# Patient Record
Sex: MALE | Race: BLACK OR AFRICAN AMERICAN | Employment: FULL TIME | URBAN - METROPOLITAN AREA
[De-identification: names, ages, dates, MRNs, and addresses within clinical notes are randomized per-mention and may not be internally consistent; named-entity substitution may affect disease eponyms.]

---

## 2019-12-21 ENCOUNTER — HOSPITAL ENCOUNTER (OUTPATIENT)
Age: 69
Setting detail: OBSERVATION
Discharge: HOME | End: 2019-12-23
Attending: EMERGENCY MEDICINE | Admitting: EMERGENCY MEDICINE
Payer: MEDICARE

## 2019-12-21 ENCOUNTER — APPOINTMENT (OUTPATIENT)
Dept: GENERAL RADIOLOGY | Age: 69
End: 2019-12-21
Payer: MEDICARE

## 2019-12-21 PROBLEM — R07.9 CHEST PAIN: Status: ACTIVE | Noted: 2019-12-21

## 2019-12-21 LAB
A/G RATIO: 1.3 (ref 1.1–2.2)
ALBUMIN SERPL-MCNC: 3.9 G/DL (ref 3.4–5)
ALP BLD-CCNC: 82 U/L (ref 40–129)
ALT SERPL-CCNC: 10 U/L (ref 10–40)
ANION GAP SERPL CALCULATED.3IONS-SCNC: 10 MMOL/L (ref 3–16)
AST SERPL-CCNC: 14 U/L (ref 15–37)
BASOPHILS ABSOLUTE: 0.1 K/UL (ref 0–0.2)
BASOPHILS RELATIVE PERCENT: 1.3 %
BILIRUB SERPL-MCNC: 0.4 MG/DL (ref 0–1)
BUN BLDV-MCNC: 12 MG/DL (ref 7–20)
CALCIUM SERPL-MCNC: 9.6 MG/DL (ref 8.3–10.6)
CHLORIDE BLD-SCNC: 103 MMOL/L (ref 99–110)
CO2: 25 MMOL/L (ref 21–32)
CREAT SERPL-MCNC: 0.7 MG/DL (ref 0.8–1.3)
EOSINOPHILS ABSOLUTE: 0.3 K/UL (ref 0–0.6)
EOSINOPHILS RELATIVE PERCENT: 5.6 %
GFR AFRICAN AMERICAN: >60
GFR NON-AFRICAN AMERICAN: >60
GLOBULIN: 3 G/DL
GLUCOSE BLD-MCNC: 101 MG/DL (ref 70–99)
HCT VFR BLD CALC: 38.6 % (ref 40.5–52.5)
HEMOGLOBIN: 12.3 G/DL (ref 13.5–17.5)
LIPASE: 19 U/L (ref 13–60)
LYMPHOCYTES ABSOLUTE: 1.7 K/UL (ref 1–5.1)
LYMPHOCYTES RELATIVE PERCENT: 32.5 %
MAGNESIUM: 2.1 MG/DL (ref 1.8–2.4)
MCH RBC QN AUTO: 25.8 PG (ref 26–34)
MCHC RBC AUTO-ENTMCNC: 32 G/DL (ref 31–36)
MCV RBC AUTO: 80.8 FL (ref 80–100)
MONOCYTES ABSOLUTE: 0.5 K/UL (ref 0–1.3)
MONOCYTES RELATIVE PERCENT: 9.2 %
NEUTROPHILS ABSOLUTE: 2.7 K/UL (ref 1.7–7.7)
NEUTROPHILS RELATIVE PERCENT: 51.4 %
PDW BLD-RTO: 14.5 % (ref 12.4–15.4)
PLATELET # BLD: 209 K/UL (ref 135–450)
PMV BLD AUTO: 8.5 FL (ref 5–10.5)
POTASSIUM SERPL-SCNC: 4.3 MMOL/L (ref 3.5–5.1)
PRO-BNP: 114 PG/ML (ref 0–124)
RBC # BLD: 4.78 M/UL (ref 4.2–5.9)
SODIUM BLD-SCNC: 138 MMOL/L (ref 136–145)
TOTAL PROTEIN: 6.9 G/DL (ref 6.4–8.2)
TROPONIN: <0.01 NG/ML
WBC # BLD: 5.2 K/UL (ref 4–11)

## 2019-12-21 PROCEDURE — G0378 HOSPITAL OBSERVATION PER HR: HCPCS

## 2019-12-21 PROCEDURE — 99285 EMERGENCY DEPT VISIT HI MDM: CPT

## 2019-12-21 PROCEDURE — 83690 ASSAY OF LIPASE: CPT

## 2019-12-21 PROCEDURE — 2580000003 HC RX 258: Performed by: HOSPITALIST

## 2019-12-21 PROCEDURE — 36415 COLL VENOUS BLD VENIPUNCTURE: CPT

## 2019-12-21 PROCEDURE — 96372 THER/PROPH/DIAG INJ SC/IM: CPT

## 2019-12-21 PROCEDURE — 6370000000 HC RX 637 (ALT 250 FOR IP): Performed by: PHYSICIAN ASSISTANT

## 2019-12-21 PROCEDURE — 93005 ELECTROCARDIOGRAM TRACING: CPT | Performed by: PHYSICIAN ASSISTANT

## 2019-12-21 PROCEDURE — 85025 COMPLETE CBC W/AUTO DIFF WBC: CPT

## 2019-12-21 PROCEDURE — 80053 COMPREHEN METABOLIC PANEL: CPT

## 2019-12-21 PROCEDURE — 83735 ASSAY OF MAGNESIUM: CPT

## 2019-12-21 PROCEDURE — 6370000000 HC RX 637 (ALT 250 FOR IP): Performed by: HOSPITALIST

## 2019-12-21 PROCEDURE — 83880 ASSAY OF NATRIURETIC PEPTIDE: CPT

## 2019-12-21 PROCEDURE — 71046 X-RAY EXAM CHEST 2 VIEWS: CPT

## 2019-12-21 PROCEDURE — 6360000002 HC RX W HCPCS: Performed by: HOSPITALIST

## 2019-12-21 PROCEDURE — 84484 ASSAY OF TROPONIN QUANT: CPT

## 2019-12-21 RX ORDER — MORPHINE SULFATE 2 MG/ML
2 INJECTION, SOLUTION INTRAMUSCULAR; INTRAVENOUS EVERY 4 HOURS PRN
Status: DISCONTINUED | OUTPATIENT
Start: 2019-12-21 | End: 2019-12-23 | Stop reason: HOSPADM

## 2019-12-21 RX ORDER — ATORVASTATIN CALCIUM 40 MG/1
40 TABLET, FILM COATED ORAL NIGHTLY
Status: DISCONTINUED | OUTPATIENT
Start: 2019-12-21 | End: 2019-12-23 | Stop reason: HOSPADM

## 2019-12-21 RX ORDER — SODIUM CHLORIDE 0.9 % (FLUSH) 0.9 %
10 SYRINGE (ML) INJECTION PRN
Status: DISCONTINUED | OUTPATIENT
Start: 2019-12-21 | End: 2019-12-23 | Stop reason: HOSPADM

## 2019-12-21 RX ORDER — ONDANSETRON 2 MG/ML
4 INJECTION INTRAMUSCULAR; INTRAVENOUS EVERY 6 HOURS PRN
Status: DISCONTINUED | OUTPATIENT
Start: 2019-12-21 | End: 2019-12-23 | Stop reason: HOSPADM

## 2019-12-21 RX ORDER — SODIUM CHLORIDE 0.9 % (FLUSH) 0.9 %
10 SYRINGE (ML) INJECTION EVERY 12 HOURS SCHEDULED
Status: DISCONTINUED | OUTPATIENT
Start: 2019-12-21 | End: 2019-12-23 | Stop reason: HOSPADM

## 2019-12-21 RX ORDER — NITROGLYCERIN 0.4 MG/1
0.4 TABLET SUBLINGUAL EVERY 5 MIN PRN
Status: DISCONTINUED | OUTPATIENT
Start: 2019-12-21 | End: 2019-12-23 | Stop reason: HOSPADM

## 2019-12-21 RX ORDER — ASPIRIN 81 MG/1
81 TABLET, CHEWABLE ORAL DAILY
Status: DISCONTINUED | OUTPATIENT
Start: 2019-12-22 | End: 2019-12-23 | Stop reason: HOSPADM

## 2019-12-21 RX ADMIN — DESMOPRESSIN ACETATE 40 MG: 0.2 TABLET ORAL at 21:37

## 2019-12-21 RX ADMIN — Medication 10 ML: at 21:38

## 2019-12-21 RX ADMIN — NITROGLYCERIN 1 INCH: 20 OINTMENT TOPICAL at 12:34

## 2019-12-21 RX ADMIN — ENOXAPARIN SODIUM 40 MG: 40 INJECTION SUBCUTANEOUS at 21:38

## 2019-12-21 ASSESSMENT — ENCOUNTER SYMPTOMS
DIARRHEA: 0
CONSTIPATION: 0
WHEEZING: 0
SHORTNESS OF BREATH: 0
BACK PAIN: 0
COLOR CHANGE: 0
COUGH: 0
STRIDOR: 0
NAUSEA: 0
ABDOMINAL DISTENTION: 0
ABDOMINAL PAIN: 0
VOMITING: 0

## 2019-12-21 ASSESSMENT — PAIN SCALES - GENERAL
PAINLEVEL_OUTOF10: 0
PAINLEVEL_OUTOF10: 5
PAINLEVEL_OUTOF10: 0
PAINLEVEL_OUTOF10: 0

## 2019-12-21 NOTE — ED PROVIDER NOTES
stridor. Cardiovascular: Positive for chest pain. Negative for palpitations and leg swelling. Gastrointestinal: Negative for abdominal distention, abdominal pain, constipation, diarrhea, nausea and vomiting. Endocrine: Negative. Genitourinary: Negative. Musculoskeletal: Negative for back pain, neck pain and neck stiffness. Skin: Negative for color change, pallor, rash and wound. Neurological: Negative for dizziness, tremors, seizures, syncope, facial asymmetry, speech difficulty, weakness, light-headedness, numbness and headaches. Psychiatric/Behavioral: Negative for confusion. All other systems reviewed and are negative. Positives and Pertinent negatives as per HPI. Except as noted above in the ROS, all other systems were reviewed and negative. PAST MEDICAL HISTORY     Past Medical History:   Diagnosis Date    Cancer [facility])     prostate         SURGICAL HISTORY   History reviewed. No pertinent surgical history. CURRENTMEDICATIONS       Previous Medications    No medications on file         ALLERGIES     Patient has no known allergies. FAMILYHISTORY     History reviewed. No pertinent family history. SOCIAL HISTORY       Social History     Tobacco Use    Smoking status: Never Smoker    Smokeless tobacco: Never Used   Substance Use Topics    Alcohol use: Not Currently    Drug use: Not Currently       SCREENINGS             PHYSICAL EXAM    (up to 7 for level 4, 8 or more for level 5)     ED Triage Vitals [12/21/19 1150]   BP Temp Temp Source Pulse Resp SpO2 Height Weight   (!) 173/80 97.9 °F (36.6 °C) Infrared 52 15 98 % 6' 3\" (1.905 m) 225 lb (102.1 kg)       Physical Exam  Vitals signs and nursing note reviewed. Constitutional:       Appearance: Normal appearance. He is well-developed. He is not toxic-appearing or diaphoretic. HENT:      Head: Normocephalic and atraumatic.       Right Ear: Tympanic membrane, ear canal and external ear normal.      Left Ear: without acute process. No acute process. PROCEDURES   Unless otherwise noted below, none     Procedures    CRITICAL CARE TIME   N/A    CONSULTS:  Dr. Leslie Cox will admit ([id number] [phone])    EMERGENCY DEPARTMENT COURSE and DIFFERENTIAL DIAGNOSIS/MDM:   Vitals:    Vitals:    12/21/19 1150 12/21/19 1235   BP: (!) 173/80 135/61   Pulse: 52 52   Resp: 15 18   Temp: 97.9 °F (36.6 °C)    TempSrc: Infrared    SpO2: 98% 98%   Weight: 225 lb (102.1 kg)    Height: 6' 3\" (1.905 m)        Patient was given thefollowing medications:  Medications   nitroglycerin (NITRO-BID) 2 % ointment 1 inch (1 inch Topical Given 12/21/19 1234)       This patient presents to the emergency department complaining of left-sided chest pain on and off for 3 days. He does have abnormal EKG changes. No former EKG to compare to. Troponin is negative. Patient and family understand and agree with plan for admission. Vital stable at this time. We have addressed concerns and expectations. My suspicion is low for  PE, myocarditis, pericarditis, endocarditis, acute pulmonary edema, pleural effusion, pericardial effusion, cardiac tamponade, acute CHF exacerbation, thoracic aortic dissection, esophageal rupture, other life-threatening arrhythmia, hypertensive urgency or emergency, hemothorax, pulmonary contusion, subcutaneous emphysema, flail chest, pneumo mediastinum, rib fracture, pneumonia, pneumothorax, or other concerning pathology. FINAL IMPRESSION      1. Acute chest pain    2. Abnormal EKG          DISPOSITION/PLAN   DISPOSITION Decision To Admit 12/21/2019 01:14:37 PM      PATIENT REFERREDTO:  No follow-up provider specified.     DISCHARGE MEDICATIONS:  New Prescriptions    No medications on file       DISCONTINUED MEDICATIONS:  Discontinued Medications    No medications on file              (Please note that portions of this note were completed with a voice recognition program.  Efforts were made to edit the dictations but occasionally words are mis-transcribed.)    Lelo Mauricio PA-C (electronically signed)           Lelo Mauricio PA-C  12/21/19 3478

## 2019-12-21 NOTE — ED PROVIDER NOTES
I personally evaluated and examined the patient in conjunction with the [place] and agree with the assessment, treatment plan and disposition of the patient as recorded by the [place]. I reviewed pertinent nursing notes, triage notes, vital signs, past medical history, family and social history, medications, and allergies. Complete review of systems was conducted by the [place] and/or myself. Review of systems is negative except as documented in the history of present illness. Brief HPI: 28-year-old gentleman presents the emergency department chief complaint of left-sided chest pain, pressure intermittent without radiation. No alleviating or aggravating factors. Reports history of smoking, and hypertension. No family history of ACS. Denies past history of DVT or PE, denies hormone therapy use, denies recent surgery, denies recent travel. No calf pain or lower extremity swelling. Physical Exam: General: Patient is in no acute distress   Head: Normocephalic, atraumatic, pupils are equal and reactive to light. EOMI. Neck: Neck is supple. No JVD noted. Heart: RRR no murmurs, rubs, or gallops   Lungs: CTA BL   Abdomen: soft, non-tender, non-distended   Extremities: no lower extremity edema. Capillary refill is less than 2 seconds   Skin: no cyanosis or pallor; no rashes noted   Neuro: CN's 2-12 are grossly intact. No focal neurologic deficit appreciated. Cardiac work-up initiated. Patient with EKG abnormalities. Heart score is moderate. Recommend admission. EKG: EKG interpretation by ED physician: Normal axis, sinus bradycardia 53 bpm with anterior and lateral T wave inversions. No  Old EKGs to compare this to. FINAL IMPRESSION     1. Acute chest pain    2. Abnormal EKG            Electronically signed by:   Regis Harding DO  12/21/19 2012

## 2019-12-21 NOTE — H&P
[facility]    12/21/2019 1:44 PM    Patient Information:  Ace Mei is a 77 y.o. male [id number]  PCP:  No primary care provider on file. (Tel: None )    Chief complaint:    Chief Complaint   Patient presents with    Chest Pain     left sided chest pain/ pressure began three days ago. History of Present Illness:    Kyle Tsang is a 77 y.o. present with chest pain. Onset about 3 days ago intermittent. Patient said pressure-like pain. Happens when he tries to exert himself. But no significant activity patient has sedentary life. Says normally sits at the desk does not exercise plays golf occasionally has not played golf for past 3 weeks. Patient rated pain 3 out of 10. Was given nitro which helped. No previous history of cardiac disease and stress test years ago with no acute finding. Currently chest pain-free   onset about       History obtained from patient  Old medical records show   REVIEW OF SYSTEMS:   Constitutional: Negative for fever,chills or night sweats  ENT: Negative for rhinorrhea, epistaxis, hoarseness, sore throat. Respiratory: Negative for shortness of breath,wheezing  Cardiovascular:postive  for chest pain, negative for palpitations   Gastrointestinal: Negative for nausea, vomiting, diarrhea  Genitourinary: Negative for polyuria, dysuria   Hematologic/Lymphatic: Negative for bleeding tendency, easy bruising  Musculoskeletal: Negative for myalgias and arthralgias  Neurologic: Negative for confusion,dysarthria. Skin: Negative for itching,rash  Psychiatric: Negative for depression,anxiety, agitation. Endocrine: Negative for polydipsia,polyuria,heat /cold intolerance. Past Medical History:   has a past medical history of Cancer ([facility] [address] Past Surgical History:   has no past surgical history on file.      Medications:  No current facility-administered medications on file prior to encounter. No current outpatient medications on file prior to encounter. Allergies:  No Known Allergies     Social History:   reports that he has never smoked. He has never used smokeless tobacco. He reports previous alcohol use. He reports previous drug use. Family History:  family history is not on file. ,     Physical Exam:  /61   Pulse 52   Temp 97.9 °F (36.6 °C) (Infrared)   Resp 18   Ht 6' 3\" (1.905 m)   Wt 225 lb (102.1 kg)   SpO2 98%   BMI 28.12 kg/m²     General appearance:  Appears comfortable. Well nourished  Eyes: Sclera clear, pupils equal  ENT: Moist mucus membranes, no thrush. Trachea midline. Cardiovascular: Regular rhythm, normal S1, S2. No murmur, gallop, rub. No edema in lower extremities  Respiratory: Clear to auscultation bilaterally, no wheeze, good inspiratory effort  Gastrointestinal: Abdomen soft, non-tender, not distended, normal bowel sounds  Musculoskeletal: No cyanosis in digits, neck supple  Neurology: Cranial nerves grossly intact. Alert and oriented in time, place and person. No speech or motor deficits  Psychiatry: Appropriate affect. Not agitated  Skin: Warm, dry, normal turgor, no rash    Labs:  CBC:   Lab Results   Component Value Date    WBC 5.2 12/21/2019    RBC 4.78 12/21/2019    HGB 12.3 12/21/2019    HCT 38.6 12/21/2019    MCV 80.8 12/21/2019    MCH 25.8 12/21/2019    MCHC 32.0 12/21/2019    RDW 14.5 12/21/2019     12/21/2019    MPV 8.5 12/21/2019     BMP:    Lab Results   Component Value Date     12/21/2019    K 4.3 12/21/2019     12/21/2019    CO2 25 12/21/2019    BUN 12 12/21/2019    CREATININE 0.7 12/21/2019    CALCIUM 9.6 12/21/2019    GFRAA >60 12/21/2019    LABGLOM >60 12/21/2019    GLUCOSE 101 12/21/2019       Chest Xray:   EKG:    I visualized CXR images and EKG strips-         Problem List  Active Problems:    Chest pain  Resolved Problems:    * No resolved hospital problems.  *        Assessment/Plan:

## 2019-12-22 LAB
EKG ATRIAL RATE: 53 BPM
EKG DIAGNOSIS: NORMAL
EKG P AXIS: 59 DEGREES
EKG P-R INTERVAL: 166 MS
EKG Q-T INTERVAL: 402 MS
EKG QRS DURATION: 92 MS
EKG QTC CALCULATION (BAZETT): 377 MS
EKG R AXIS: 43 DEGREES
EKG T AXIS: 102 DEGREES
EKG VENTRICULAR RATE: 53 BPM
HCT VFR BLD CALC: 35.9 % (ref 40.5–52.5)
HEMOGLOBIN: 11.5 G/DL (ref 13.5–17.5)
MCH RBC QN AUTO: 25.9 PG (ref 26–34)
MCHC RBC AUTO-ENTMCNC: 32 G/DL (ref 31–36)
MCV RBC AUTO: 81.1 FL (ref 80–100)
PDW BLD-RTO: 14.5 % (ref 12.4–15.4)
PLATELET # BLD: 204 K/UL (ref 135–450)
PMV BLD AUTO: 8.5 FL (ref 5–10.5)
RBC # BLD: 4.43 M/UL (ref 4.2–5.9)
WBC # BLD: 6.4 K/UL (ref 4–11)

## 2019-12-22 PROCEDURE — 85027 COMPLETE CBC AUTOMATED: CPT

## 2019-12-22 PROCEDURE — 93010 ELECTROCARDIOGRAM REPORT: CPT | Performed by: INTERNAL MEDICINE

## 2019-12-22 PROCEDURE — 36415 COLL VENOUS BLD VENIPUNCTURE: CPT

## 2019-12-22 PROCEDURE — 6360000002 HC RX W HCPCS: Performed by: HOSPITALIST

## 2019-12-22 PROCEDURE — G0378 HOSPITAL OBSERVATION PER HR: HCPCS

## 2019-12-22 PROCEDURE — 6370000000 HC RX 637 (ALT 250 FOR IP): Performed by: PHYSICIAN ASSISTANT

## 2019-12-22 PROCEDURE — 96372 THER/PROPH/DIAG INJ SC/IM: CPT

## 2019-12-22 PROCEDURE — 2580000003 HC RX 258: Performed by: HOSPITALIST

## 2019-12-22 PROCEDURE — 6370000000 HC RX 637 (ALT 250 FOR IP): Performed by: HOSPITALIST

## 2019-12-22 RX ORDER — ACETAMINOPHEN 325 MG/1
650 TABLET ORAL EVERY 4 HOURS PRN
Status: DISCONTINUED | OUTPATIENT
Start: 2019-12-22 | End: 2019-12-23 | Stop reason: HOSPADM

## 2019-12-22 RX ADMIN — ASPIRIN 81 MG 81 MG: 81 TABLET ORAL at 08:22

## 2019-12-22 RX ADMIN — Medication 10 ML: at 22:04

## 2019-12-22 RX ADMIN — Medication 10 ML: at 08:22

## 2019-12-22 RX ADMIN — DESMOPRESSIN ACETATE 40 MG: 0.2 TABLET ORAL at 22:04

## 2019-12-22 RX ADMIN — ACETAMINOPHEN 650 MG: 325 TABLET, FILM COATED ORAL at 04:37

## 2019-12-22 RX ADMIN — ENOXAPARIN SODIUM 40 MG: 40 INJECTION SUBCUTANEOUS at 22:04

## 2019-12-22 ASSESSMENT — PAIN SCALES - GENERAL
PAINLEVEL_OUTOF10: 0
PAINLEVEL_OUTOF10: 7
PAINLEVEL_OUTOF10: 0

## 2019-12-22 ASSESSMENT — PAIN DESCRIPTION - DESCRIPTORS: DESCRIPTORS: HEADACHE

## 2019-12-22 ASSESSMENT — PAIN DESCRIPTION - LOCATION: LOCATION: HEAD

## 2019-12-22 ASSESSMENT — PAIN DESCRIPTION - PAIN TYPE: TYPE: ACUTE PAIN

## 2019-12-22 NOTE — PLAN OF CARE
Problem: Pain:  Goal: Pain level will decrease  Description  Pain level will decrease  12/22/2019 1558 by Eva King RN  Outcome: Ongoing  12/22/2019 0805 by Yadiel Boyd RN  Outcome: Ongoing   Denies pain , since headache was resolved. Problem: Falls - Risk of:  Goal: Will remain free from falls  Description  Will remain free from falls  12/22/2019 1558 by Eva King RN  Outcome: Ongoing  12/22/2019 0805 by Yadiel Boyd RN  Outcome: Ongoing   Up in halls and in room. Waiting for tests tomorrow and hoping for the best outcome or at least the least invasive outcome.

## 2019-12-23 VITALS
OXYGEN SATURATION: 99 % | SYSTOLIC BLOOD PRESSURE: 160 MMHG | TEMPERATURE: 96.8 F | DIASTOLIC BLOOD PRESSURE: 80 MMHG | HEART RATE: 56 BPM | HEIGHT: 75 IN | RESPIRATION RATE: 18 BRPM | WEIGHT: 221.78 LBS | BODY MASS INDEX: 27.58 KG/M2

## 2019-12-23 LAB — POC ACT LR: 169 SEC

## 2019-12-23 PROCEDURE — 6370000000 HC RX 637 (ALT 250 FOR IP): Performed by: HOSPITALIST

## 2019-12-23 PROCEDURE — 6360000004 HC RX CONTRAST MEDICATION: Performed by: INTERNAL MEDICINE

## 2019-12-23 PROCEDURE — 93458 L HRT ARTERY/VENTRICLE ANGIO: CPT | Performed by: INTERNAL MEDICINE

## 2019-12-23 PROCEDURE — G0378 HOSPITAL OBSERVATION PER HR: HCPCS

## 2019-12-23 PROCEDURE — C1894 INTRO/SHEATH, NON-LASER: HCPCS

## 2019-12-23 PROCEDURE — 93571 IV DOP VEL&/PRESS C FLO 1ST: CPT

## 2019-12-23 PROCEDURE — 2720000010 HC SURG SUPPLY STERILE

## 2019-12-23 PROCEDURE — 99153 MOD SED SAME PHYS/QHP EA: CPT

## 2019-12-23 PROCEDURE — 2500000003 HC RX 250 WO HCPCS

## 2019-12-23 PROCEDURE — C1887 CATHETER, GUIDING: HCPCS

## 2019-12-23 PROCEDURE — 85347 COAGULATION TIME ACTIVATED: CPT

## 2019-12-23 PROCEDURE — 6360000002 HC RX W HCPCS

## 2019-12-23 PROCEDURE — 93017 CV STRESS TEST TRACING ONLY: CPT | Performed by: INTERNAL MEDICINE

## 2019-12-23 PROCEDURE — 99152 MOD SED SAME PHYS/QHP 5/>YRS: CPT | Performed by: INTERNAL MEDICINE

## 2019-12-23 PROCEDURE — 6360000002 HC RX W HCPCS: Performed by: INTERNAL MEDICINE

## 2019-12-23 PROCEDURE — 99152 MOD SED SAME PHYS/QHP 5/>YRS: CPT

## 2019-12-23 PROCEDURE — 2580000003 HC RX 258

## 2019-12-23 PROCEDURE — 93458 L HRT ARTERY/VENTRICLE ANGIO: CPT

## 2019-12-23 PROCEDURE — 93571 IV DOP VEL&/PRESS C FLO 1ST: CPT | Performed by: INTERNAL MEDICINE

## 2019-12-23 PROCEDURE — 78451 HT MUSCLE IMAGE SPECT SING: CPT | Performed by: INTERNAL MEDICINE

## 2019-12-23 PROCEDURE — 3430000000 HC RX DIAGNOSTIC RADIOPHARMACEUTICAL: Performed by: INTERNAL MEDICINE

## 2019-12-23 PROCEDURE — C1769 GUIDE WIRE: HCPCS

## 2019-12-23 PROCEDURE — A9502 TC99M TETROFOSMIN: HCPCS | Performed by: INTERNAL MEDICINE

## 2019-12-23 PROCEDURE — 2709999900 HC NON-CHARGEABLE SUPPLY

## 2019-12-23 RX ORDER — ATORVASTATIN CALCIUM 40 MG/1
40 TABLET, FILM COATED ORAL NIGHTLY
Qty: 30 TABLET | Refills: 0 | Status: SHIPPED | OUTPATIENT
Start: 2019-12-23

## 2019-12-23 RX ORDER — NITROGLYCERIN 0.4 MG/1
TABLET SUBLINGUAL
Qty: 25 TABLET | Refills: 0 | Status: SHIPPED | OUTPATIENT
Start: 2019-12-23

## 2019-12-23 RX ORDER — ASPIRIN 81 MG/1
81 TABLET, CHEWABLE ORAL DAILY
Qty: 30 TABLET | Refills: 0 | Status: SHIPPED | OUTPATIENT
Start: 2019-12-24

## 2019-12-23 RX ORDER — AMLODIPINE BESYLATE 5 MG/1
5 TABLET ORAL DAILY
Qty: 30 TABLET | Refills: 0 | Status: SHIPPED | OUTPATIENT
Start: 2019-12-23 | End: 2020-01-10

## 2019-12-23 RX ADMIN — REGADENOSON 0.4 MG: 0.08 INJECTION, SOLUTION INTRAVENOUS at 10:00

## 2019-12-23 RX ADMIN — IOPAMIDOL 43 ML: 755 INJECTION, SOLUTION INTRAVENOUS at 13:18

## 2019-12-23 RX ADMIN — TETROFOSMIN 30 MILLICURIE: 1.38 INJECTION, POWDER, LYOPHILIZED, FOR SOLUTION INTRAVENOUS at 10:06

## 2019-12-23 RX ADMIN — NITROGLYCERIN 0.4 MG: 0.4 TABLET, ORALLY DISINTEGRATING SUBLINGUAL at 10:10

## 2019-12-23 RX ADMIN — TETROFOSMIN 10 MILLICURIE: 1.38 INJECTION, POWDER, LYOPHILIZED, FOR SOLUTION INTRAVENOUS at 08:37

## 2019-12-23 ASSESSMENT — PAIN SCALES - GENERAL
PAINLEVEL_OUTOF10: 0

## 2019-12-23 NOTE — CARE COORDINATION
Patient admitted as Observation/OPIB with an anticipated short hospitalization length of stay. Chart reviewed and it appears that patient has minimal needs for discharge at this time. Discussed with patients nurse and requested that case management be notified if discharge needs are identified. *Case management will continue to follow progress and update discharge plan as needed.     NYLA ReillyN, CCM, RN  [facility]  [phone]

## 2019-12-23 NOTE — PRE SEDATION
mL Intravenous PRN Vickie Luis MD        magnesium hydroxide (MILK OF MAGNESIA) 400 MG/5ML suspension 30 mL  30 mL Oral Daily PRN Federica Bernal MD        ondansetron [facility]) injection 4 mg  4 mg Intravenous Q6H PRN Federica Bernal MD        atorvastatin (LIPITOR) tablet 40 mg  40 mg Oral Nightly Federica Bernal MD   40 mg at 12/22/19 2204    aspirin chewable tablet 81 mg  81 mg Oral Daily Federica Bernal MD   81 mg at 12/22/19 1655    enoxaparin (LOVENOX) injection 40 mg  40 mg Subcutaneous Nightly Federica Bernal MD   40 mg at 12/22/19 2204    nitroGLYCERIN (NITROSTAT) SL tablet 0.4 mg  0.4 mg Sublingual Q5 Min PRN Federica Bernal MD   0.4 mg at 12/23/19 1010    morphine (PF) injection 2 mg  2 mg Intravenous Q4H PRN Vickie Luis MD               Pre-Sedation:    Pre-Sedation Documentation and Exam:  I have assessed the patient and agree with the H&P present on the chart. Prior History of Anesthesia Complications:   none    Modified Mallampati:  II (soft palate, uvula, fauces visible)    ASA Classification:  Class 2 - A normal healthy patient with mild systemic disease      Ermelinda Scale: Activity:  2 - Able to move 4 extremities voluntarily on command  Respiration:  2 - Able to breathe deeply and cough freely  Circulation:  2 - BP+/- 20mmHg of normal  Consciousness:  2 - Fully awake  Oxygen Saturation (color):  2 - Able to maintain oxygen saturation >92% on room air    Sedation/Anesthesia Plan:  Guard the patient's safety and welfare. Minimize physical discomfort and pain. Minimize negative psychological responses to treatment by providing sedation and analgesia and maximize the potential amnesia. Patient to meet pre-procedure discharge plan.     Medication Planned:  midazolam intravenously and fentanyl intravenously    Patient is an appropriate candidate for plan of sedation: yes      Electronically signed by Pari Patel MD on 12/23/2019 at 11:22 AM

## 2019-12-23 NOTE — DISCHARGE SUMMARY
bilaterally, no wheeze. Gastrointestinal. Abdomen soft, non-tender, not distended, normal bowel sounds  Neurology. Facial symmetry. No speech deficits. Moving all extremities equally. Extremities. No edema in lower extremities. Skin. Warm, dry, normal turgor        Condition at time of discharge stable    Medication instructions provided to patient at discharge. Medication List      START taking these medications    amLODIPine 5 MG tablet  Commonly known as:  NORVASC  Take 1 tablet by mouth daily     aspirin 81 MG chewable tablet  Take 1 tablet by mouth daily  Start taking on:  December 24, 2019     atorvastatin 40 MG tablet  Commonly known as:  LIPITOR  Take 1 tablet by mouth nightly     nitroGLYCERIN 0.4 MG SL tablet  Commonly known as:  NITROSTAT  up to max of 3 total doses. If no relief after 1 dose, call 911. Where to Get Your Medications      These medications were sent to [address] [place] [address], [address] [place] [address]    Phone:  [phone]   · amLODIPine 5 MG tablet  · aspirin 81 MG chewable tablet  · atorvastatin 40 MG tablet  · nitroGLYCERIN 0.4 MG SL tablet         Discharge recommendations given to patient. Follow Up. pcp  in 1 week   Disposition. home  Activity. activity as tolerated  Diet: DIET CARDIAC; No Caffeine      Spent 28  minutes in discharge process.     Signed:  Agustina Parker MD     12/23/2019 3:00 PM

## 2019-12-23 NOTE — OP NOTE
[address]   Procedure Note      Procedure: [facility]  Indication: Abnormal stress test  Consent: Verbal and/or written consent obtained prior to procedure. Sedation: Minimal conscious sedation utilized for comfort. Complic: None  EBL:  <41PQ  Specimens: None  Fluoro: 4.7 min  Contrast: 43 cc  Access: RRA (MD >2mm)  Ultrasond: Ultrasound guidance used to determine aforementioned artery patency, size, anatomic variations and ideal puncture location. Real-time ultrasound utilized concurrent with vascular needle entry into the artery. Image(s) permanently recorded and reported in the patient chart.       Findings:    LM Normal   LAD 20% distal   Cx 70% mid at bifurcation OM2 (FFR 0.83-not significant), 30% diffuse distal   RCA 30% prox   LVG 60%   EDP 5 mmHg  Severe Ca++:None  Post Cath Dx:Nonobstructive CAD  Intervention: FFR of Cx - 0.83 - not significant

## 2019-12-23 NOTE — PROGRESS NOTES
Data- discharge order received, pt verbalized agreement to discharge, disposition to previous residence, no needs for HHC/DME. Action- discharge instructions prepared and given to pt, pt verbalized understanding. Medication information packet given r/t NEW and/or CHANGED prescriptions emphasizing name/purpose/side effects, pt verbalized understanding. Discharge instruction summary: Diet- cardiac, Activity- as mami, Primary Care Physician as follows: No primary care provider on file. [place] appt with Dr. Pau Irizarry, immunizations reviewed, prescription medications filled Elicia Hernandez. Inpatient surgical procedure precautions reviewed: r. radial cath site     Response- Pt belongings gathered, IV removed. Disposition is home (no HHC/DME needs), transported with spouse, ambulated to lobby, no complications.
EKG abnormalities and hx reviewed with Dr. Cynthia Araujo. Pt states to be able to do GXT at this time. Patient instructed on Nino Protocol Stress Test Procedure including possible side effects and adverse reactions. Verbalizes knowledge and understanding and denies having any questions.
Patient admitted to 5911 from the ER for chest pain. Denies any pain at this time. States he has had it off and on for 3 days, nothing made it better or worse. He did say he took a tums that improved his pain. Oriented to unit and routine. Vitals stable at this time. Will continue to monitor.
Pt currently c/o headache, but has nothing PRN ordered. Perfect serve message sent to [facility] to notify of pt's request.  Will continue to monitor.   Katelynn Salcedo
Pt had abn stress with EKG changes and run of VT with Lexiscan. Dr. Nick Weir in room and ordered nitro and pt to have cath today. Pt denies any chest pain with stress test and had c/o dizziness. Pt taken to cath lab via stretcher with monitor.
Pt return from cath to room 3311 at 1420. Right radial Cath site CDI, pulses +2 bilaterally, radial band closure device. 9 ml air at transfer to start removing per orders 1435 (originally 13 ml with 4 ml already removed). Vitals stable, to be taken per Bedrest complete now, patient aware, denies questions. Report from nurse . Will continue to monitor.
Shift assessment completed, see doc flowsheets. Pt currently resting quietly in bed, has no complaints at this time. Call light within reach, will continue to monitor.   Tee Borja
Shift assessment completed, see doc flowsheets. Pt currently sitting up in his chair, wife at bedside, currently has no complaints at this time. Call light within reach, will continue to monitor.   Fer Anaya
No speech or motor deficits  Psych: Normal affect. Alert and oriented in time, place and person  Skin: Warm, dry, normal turgor    Labs and Tests:  CBC:   Recent Labs     12/21/19  1216 12/22/19  0519   WBC 5.2 6.4   HGB 12.3* 11.5*    204     BMP:    Recent Labs     12/21/19  1216      K 4.3      CO2 25   BUN 12   CREATININE 0.7*   GLUCOSE 101*     Hepatic:   Recent Labs     12/21/19  1216   AST 14*   ALT 10   BILITOT 0.4   ALKPHOS 82       Discussed care with family and patient             Spent 30  minutes with patient and family at bedside and on unit reviewing medical records and labs, spent greater than 50% time counseling patient and family on diagnosis and plan   Problem List  Active Problems:    Chest pain  Resolved Problems:    * No resolved hospital problems. *       Assessment & Plan:   1.  Chest pain  -None currently  -His troponins been negative EKG is been stable  -Plan for stress test tomorrow and hopefully discharge      Diet: DIET GENERAL; No Caffeine  Code:Full Code  DVT PPX lovenox       Zen Domínguez MD   12/22/2019 9:38 AM

## 2020-01-10 ENCOUNTER — OFFICE VISIT (OUTPATIENT)
Dept: CARDIOLOGY CLINIC | Age: 70
End: 2020-01-10
Payer: MEDICARE

## 2020-01-10 VITALS
OXYGEN SATURATION: 99 % | SYSTOLIC BLOOD PRESSURE: 142 MMHG | WEIGHT: 228 LBS | BODY MASS INDEX: 28.35 KG/M2 | HEART RATE: 77 BPM | DIASTOLIC BLOOD PRESSURE: 72 MMHG | HEIGHT: 75 IN

## 2020-01-10 PROCEDURE — G8427 DOCREV CUR MEDS BY ELIG CLIN: HCPCS | Performed by: NURSE PRACTITIONER

## 2020-01-10 PROCEDURE — G8417 CALC BMI ABV UP PARAM F/U: HCPCS | Performed by: NURSE PRACTITIONER

## 2020-01-10 PROCEDURE — 3017F COLORECTAL CA SCREEN DOC REV: CPT | Performed by: NURSE PRACTITIONER

## 2020-01-10 PROCEDURE — 1036F TOBACCO NON-USER: CPT | Performed by: NURSE PRACTITIONER

## 2020-01-10 PROCEDURE — 4040F PNEUMOC VAC/ADMIN/RCVD: CPT | Performed by: NURSE PRACTITIONER

## 2020-01-10 PROCEDURE — 99214 OFFICE O/P EST MOD 30 MIN: CPT | Performed by: NURSE PRACTITIONER

## 2020-01-10 PROCEDURE — G8484 FLU IMMUNIZE NO ADMIN: HCPCS | Performed by: NURSE PRACTITIONER

## 2020-01-10 PROCEDURE — 1123F ACP DISCUSS/DSCN MKR DOCD: CPT | Performed by: NURSE PRACTITIONER

## 2020-01-10 NOTE — LETTER
Component Value Date    CREATININE 0.7 (L) 12/21/2019    BUN 12 12/21/2019     12/21/2019    K 4.3 12/21/2019     12/21/2019    CO2 25 12/21/2019     No results found for: TSH, N5WRCXX, E5VHGWR, THYROIDAB  Lab Results   Component Value Date    WBC 6.4 12/22/2019    HGB 11.5 (L) 12/22/2019    HCT 35.9 (L) 12/22/2019    MCV 81.1 12/22/2019     12/22/2019     Radiology Review:  Pertinent images / reports were reviewed as a part of this visit and reveals the following:    Last Stress Test: 12/23/19:  Summary    There is normal isotope uptake at rest.    Stress imaging was cancelled due to abnormal ECG and clinical response to    Lexiscan and persistent diffuse T wave inversion.    Patient referred for cardiac catheterization today. Last Angiogram: 12/23/19:  Findings:                      LM       Normal              LAD     20% distal              Cx        70% mid at bifurcation OM2 (FFR 0.83-not significant), 30% diffuse distal              RCA     30% prox              LVG     60%              EDP     5 mmHg  Post Cath Dx:Nonobstructive CAD  Intervention:  FFR of Cx - 0.83 - not significant      Assessment:      Diagnosis Orders   1. CAD  ~offers no c/o CP  ~CX at 70% bifurcation of OM-2: FFR 0.83  ~medically managed  ~ASA / statin   ~did not fill amlodipine as thought for BP alone    2. Essential hypertension   ~suboptimal in office  ~states elevated at provider's offices : controlled at [address] though    3. Mixed hyperlipidemia   ~new:   ~last profile available for review is from :   Feb '17:  trig 95 HDL 68  Lipid, Fasting    Comprehensive Metabolic Panel     Patient  is stable since hospital discharge. Plan: Fasting cholesterol levels / CMP as routine    Discussed use of amlodipine : he will notify the office if he develops chest pain or persistently high BP   F/U in 6 months     Further evaluation will be based upon the patient's clinical course and testing results.

## 2020-01-10 NOTE — PROGRESS NOTES
[facility], [place], operated by [facility]     Outpatient Follow Up Note    CHIEF COMPLAINT / HPI: Hospital Follow Up secondary to status post coronary angiogram    Hospital record has been reviewed  Hospital Course progressed as follows per discharge summary:   55-year-old male former smoker presents to us with chest discomfort serial troponin was negative. Nuclear medicine stress test was positive for EKG portion patient had a cardiac catheterization today which shows nonobstructive CAD medical management advised no stent was placed  Blood pressure not controlled we will add Norvasc target blood pressure less than 130/80      Atiya Owen is 77 y.o. male who presents today for a routine follow up after a recent hospitalization related to the above mentioned issues. He recalls feeling pressure in his chest. He took an antiacid but it kept returning. Subjective:   Since the time of discharge, the patient admits their symptoms have improved. He's felt ok. He denies recurrence of chest pressure. He denies significant chest pain. There is no SOB/BENDER. He denies swelling / cough. The patient is not experiencing palpitations. These symptoms are improving over the last few weeks. His BP goes up anytime he gets close to a hospital and/or provider's office. He choose not to start amlodipine. With regard to medication therapy the patient has been compliant with prescribed regimen. They have tolerated therapy to date.      Past Medical History:   Diagnosis Date    Cancer [facility])     prostate      Social History:    Social History     Tobacco Use   Smoking Status Former Smoker    Last attempt to quit: 2016    Years since quittin.0   Smokeless Tobacco Never Used     Current Medications:  Current Outpatient Medications   Medication Sig Dispense Refill    aspirin 81 MG chewable tablet Take 1 tablet by mouth daily 30 tablet 0    atorvastatin (LIPITOR) 40 MG tablet Take 1 tablet by mouth nightly 30 tablet 0    nitroGLYCERIN crisp and normal                                                                                          Cervical veins are not engorged                 JVP less than 8 cm H2O                                                                              The carotid upstroke is normal in amplitude and contour without delay or bruit    ABDOMEN:  Normal bowel sounds, non-distended and non-tender to palpation   EXT: No edema, no calf tenderness. Pulses are present bilaterally. DATA:    Lab Results   Component Value Date    ALT 10 12/21/2019    AST 14 (L) 12/21/2019    ALKPHOS 82 12/21/2019    BILITOT 0.4 12/21/2019     Lab Results   Component Value Date    CREATININE 0.7 (L) 12/21/2019    BUN 12 12/21/2019     12/21/2019    K 4.3 12/21/2019     12/21/2019    CO2 25 12/21/2019     No results found for: TSH, E9CVEWU, T3SIEIA, THYROIDAB  Lab Results   Component Value Date    WBC 6.4 12/22/2019    HGB 11.5 (L) 12/22/2019    HCT 35.9 (L) 12/22/2019    MCV 81.1 12/22/2019     12/22/2019     Radiology Review:  Pertinent images / reports were reviewed as a part of this visit and reveals the following:    Last Stress Test: 12/23/19:  Summary    There is normal isotope uptake at rest.    Stress imaging was cancelled due to abnormal ECG and clinical response to    Lexiscan and persistent diffuse T wave inversion.    Patient referred for cardiac catheterization today. Last Angiogram: 12/23/19:  Findings:                      LM       Normal              LAD     20% distal              Cx        70% mid at bifurcation OM2 (FFR 0.83-not significant), 30% diffuse distal              RCA     30% prox              LVG     60%              EDP     5 mmHg  Post Cath Dx:Nonobstructive CAD  Intervention:  FFR of Cx - 0.83 - not significant      Assessment:      Diagnosis Orders   1.  CAD  ~offers no c/o CP  ~CX at 70% bifurcation of OM-2: FFR 0.83  ~medically managed  ~ASA / statin   ~did not fill amlodipine as thought for BP alone    2. Essential hypertension   ~suboptimal in office  ~states elevated at provider's offices : controlled at [address] though    3. Mixed hyperlipidemia   ~new:   ~last profile available for review is from :   Feb '17:  trig 95 HDL 68  Lipid, Fasting    Comprehensive Metabolic Panel     Patient  is stable since hospital discharge. Plan: Fasting cholesterol levels / CMP as routine    Discussed use of amlodipine : he will notify the office if he develops chest pain or persistently high BP   F/U in 6 months     I have addressed the patient's cardiac risk factors and adjusted pharmacologic treatment as needed. In addition, I have reinforced the need for patient directed risk factor modification. Further evaluation will be based upon the patient's clinical course and testing results. All questions and concerns were addressed to the patient. Alternatives to  treatment were discussed. The patient  currently  is not smoking. The risks related to smoking were reviewed with the patient. Recommend maintaining a smoke-free lifestyle. Antiplatelet therapy has been recommended / prescribed for this patient. Education conducted on adverse reactions including bleeding was discussed. The patient verbalizes understanding. Pt is not on a BB : neg MI  Pt is not on an ace-i/ARB : neg CHF  Pt is on a statin      Saturated fat diet discussed  Exercise program discussed    Thank you for allowing to us to participate in the care of Best Hemal.       [address]  Documentation of today's visit sent to PCP

## 2020-01-10 NOTE — COMMUNICATION BODY
142/72   Site: Right Upper Arm     Position: Sitting     Cuff Size: Large Adult     Pulse: 77     SpO2: 99%     Weight: 228 lb (103.4 kg)     Height: 6' 3\" (1.905 m)          VITALS:  BP (!) 140/70   Pulse 77   Ht 6' 3\" (1.905 m)   Wt 228 lb (103.4 kg)   SpO2 99%   BMI 28.50 kg/m²      CONSTITUTIONAL: Cooperative, no apparent distress, and appears well nourished / developed  NEUROLOGIC:  Awake and orientated to person, place and time. PSYCH: Calm affect. SKIN: Warm and dry. HEENT: Sclera non-icteric, normocephalic, neck supple, no elevation of JVP, normal carotid pulses with no bruits and thyroid normal size. LUNGS:  No increased work of breathing and clear to auscultation, no crackles or wheezing. CARDIOVASCULAR:  Regular rate 72 and rhythm with no murmurs, gallops, rubs, or abnormal heart sounds, normal PMI. The apical impulses not displaced. Heart tones are crisp and normal                                                                                            Cervical veins are not engorged                 JVP less than 8 cm H2O                                                                              The carotid upstroke is normal in amplitude and contour without delay or bruit    ABDOMEN:  Normal bowel sounds, non-distended and non-tender to palpation   EXT: No edema, no calf tenderness. Pulses are present bilaterally.     DATA:    Lab Results   Component Value Date    ALT 10 12/21/2019    AST 14 (L) 12/21/2019    ALKPHOS 82 12/21/2019    BILITOT 0.4 12/21/2019     Lab Results   Component Value Date    CREATININE 0.7 (L) 12/21/2019    BUN 12 12/21/2019     12/21/2019    K 4.3 12/21/2019     12/21/2019    CO2 25 12/21/2019     No results found for: TSH, I6RDFCK, Z6FELOE, THYROIDAB  Lab Results   Component Value Date    WBC 6.4 12/22/2019    HGB 11.5 (L) 12/22/2019    HCT 35.9 (L) 12/22/2019    MCV 81.1 12/22/2019     12/22/2019     Radiology Review:  Pertinent images / reports were reviewed as a part of this visit and reveals the following:    Last Stress Test: 12/23/19:  Summary    There is normal isotope uptake at rest.    Stress imaging was cancelled due to abnormal ECG and clinical response to    Lexiscan and persistent diffuse T wave inversion.    Patient referred for cardiac catheterization today. Last Angiogram: 12/23/19:  Findings:                      LM       Normal              LAD     20% distal              Cx        70% mid at bifurcation OM2 (FFR 0.83-not significant), 30% diffuse distal              RCA     30% prox              LVG     60%              EDP     5 mmHg  Post Cath Dx:Nonobstructive CAD  Intervention:  FFR of Cx - 0.83 - not significant      Assessment:      Diagnosis Orders   1. CAD  ~offers no c/o CP  ~CX at 70% bifurcation of OM-2: FFR 0.83  ~medically managed  ~ASA / statin   ~did not fill amlodipine as thought for BP alone    2. Essential hypertension   ~suboptimal in office  ~states elevated at provider's offices : controlled at [address] though    3. Mixed hyperlipidemia   ~new:   ~last profile available for review is from :   Feb '17:  trig 95 HDL 68  Lipid, Fasting    Comprehensive Metabolic Panel     Patient  is stable since hospital discharge. Plan: Fasting cholesterol levels / CMP as routine    Discussed use of amlodipine : he will notify the office if he develops chest pain or persistently high BP   F/U in 6 months     Further evaluation will be based upon the patient's clinical course and testing results. Thank you for allowing to us to participate in the care of Best Hemal.       [facility]

## 2020-06-22 ENCOUNTER — HOSPITAL ENCOUNTER (EMERGENCY)
Age: 70
Discharge: HOME | End: 2020-06-22
Payer: MEDICARE

## 2020-06-22 ENCOUNTER — APPOINTMENT (OUTPATIENT)
Dept: ULTRASOUND IMAGING | Age: 70
End: 2020-06-22
Payer: MEDICARE

## 2020-06-22 VITALS
OXYGEN SATURATION: 100 % | HEART RATE: 70 BPM | WEIGHT: 222 LBS | DIASTOLIC BLOOD PRESSURE: 96 MMHG | SYSTOLIC BLOOD PRESSURE: 153 MMHG | TEMPERATURE: 98.1 F | RESPIRATION RATE: 12 BRPM | HEIGHT: 75 IN | BODY MASS INDEX: 27.6 KG/M2

## 2020-06-22 LAB
BACTERIA: ABNORMAL /HPF
BILIRUBIN URINE: NEGATIVE
BLOOD, URINE: ABNORMAL
CLARITY: ABNORMAL
COLOR: YELLOW
EPITHELIAL CELLS, UA: ABNORMAL /HPF (ref 0–5)
GLUCOSE URINE: 100 MG/DL
KETONES, URINE: NEGATIVE MG/DL
LEUKOCYTE ESTERASE, URINE: NEGATIVE
MICROSCOPIC EXAMINATION: YES
NITRITE, URINE: NEGATIVE
PH UA: 5.5 (ref 5–8)
PROTEIN UA: 30 MG/DL
RBC UA: ABNORMAL /HPF (ref 0–4)
SPECIFIC GRAVITY UA: 1.02 (ref 1–1.03)
URINE REFLEX TO CULTURE: ABNORMAL
URINE TYPE: ABNORMAL
UROBILINOGEN, URINE: 0.2 E.U./DL
WBC UA: ABNORMAL /HPF (ref 0–5)

## 2020-06-22 PROCEDURE — 81001 URINALYSIS AUTO W/SCOPE: CPT

## 2020-06-22 PROCEDURE — 87591 N.GONORRHOEAE DNA AMP PROB: CPT

## 2020-06-22 PROCEDURE — 99284 EMERGENCY DEPT VISIT MOD MDM: CPT

## 2020-06-22 PROCEDURE — 87491 CHLMYD TRACH DNA AMP PROBE: CPT

## 2020-06-22 PROCEDURE — 6370000000 HC RX 637 (ALT 250 FOR IP): Performed by: NURSE PRACTITIONER

## 2020-06-22 PROCEDURE — 87086 URINE CULTURE/COLONY COUNT: CPT

## 2020-06-22 PROCEDURE — 76870 US EXAM SCROTUM: CPT

## 2020-06-22 RX ORDER — OXYCODONE HYDROCHLORIDE AND ACETAMINOPHEN 5; 325 MG/1; MG/1
1 TABLET ORAL EVERY 6 HOURS PRN
Qty: 12 TABLET | Refills: 0 | Status: SHIPPED | OUTPATIENT
Start: 2020-06-22 | End: 2020-06-25

## 2020-06-22 RX ORDER — OXYCODONE HYDROCHLORIDE AND ACETAMINOPHEN 5; 325 MG/1; MG/1
1 TABLET ORAL ONCE
Status: COMPLETED | OUTPATIENT
Start: 2020-06-22 | End: 2020-06-22

## 2020-06-22 RX ORDER — CIPROFLOXACIN 500 MG/1
500 TABLET, FILM COATED ORAL 2 TIMES DAILY
Qty: 14 TABLET | Refills: 0 | Status: SHIPPED | OUTPATIENT
Start: 2020-06-22 | End: 2020-06-29

## 2020-06-22 RX ADMIN — OXYCODONE HYDROCHLORIDE AND ACETAMINOPHEN 1 TABLET: 5; 325 TABLET ORAL at 15:31

## 2020-06-22 ASSESSMENT — PAIN SCALES - GENERAL
PAINLEVEL_OUTOF10: 6
PAINLEVEL_OUTOF10: 10
PAINLEVEL_OUTOF10: 4
PAINLEVEL_OUTOF10: 10

## 2020-06-22 ASSESSMENT — ENCOUNTER SYMPTOMS
NAUSEA: 0
VOMITING: 0
SHORTNESS OF BREATH: 0
SORE THROAT: 0
CONSTIPATION: 0
RHINORRHEA: 0
ABDOMINAL PAIN: 0
DIARRHEA: 0
BLOOD IN STOOL: 0

## 2020-06-22 ASSESSMENT — PAIN DESCRIPTION - FREQUENCY: FREQUENCY: CONTINUOUS

## 2020-06-22 ASSESSMENT — PAIN DESCRIPTION - ORIENTATION: ORIENTATION: RIGHT;LEFT

## 2020-06-22 ASSESSMENT — PAIN DESCRIPTION - LOCATION: LOCATION: SCROTUM

## 2020-06-22 ASSESSMENT — PAIN DESCRIPTION - PAIN TYPE: TYPE: ACUTE PAIN

## 2020-06-22 ASSESSMENT — PAIN DESCRIPTION - DESCRIPTORS: DESCRIPTORS: SHOOTING;SHARP

## 2020-06-22 NOTE — ED PROVIDER NOTES
[facility] [facility]        Pt Name: Venice Donohue  MRN: [id number]  [place] 1950  Date of evaluation: 6/22/2020  Provider: POLO Lutz CNP  PCP: Geovanna Escalante    This patient was not seen and evaluated by the attending physician No att. providers found. CHIEF COMPLAINT       Chief Complaint   Patient presents with    Testicle Pain     c/o actue bilateral testicle pain that started this am.        HISTORY OF PRESENT ILLNESS   (Location/Symptom, Timing/Onset,Context/Setting, Quality, Duration, Modifying Factors, Severity)  Note limiting factors. Venice Donohue is a 72 y.o. male who presents the ER with concern for bilateral testicular pain. Symptoms started around 11 AM.  He reports it feels like soreness in both his testicles. He reports that he had a previous history of hematuria secondary to his prostate. This is since been removed. He reports history of kidney stones but reports pain today is different. He denies fever, rash, headaches, dizziness, chest pain, shortness of breath, cough, congestion, abdominal pain, nausea, vomiting, diarrhea, constipation, blood in the stool, or painful urination. One friend/family member at bedside. Nursing Notes triage note reviewed and agreed with or any disagreements were addressed  in the HPI. REVIEW OF SYSTEMS    (2-9 systems for level 4, 10 or more for level 5)     Review of Systems   Constitutional: Negative for chills and fever. HENT: Negative for postnasal drip, rhinorrhea and sore throat. Eyes: Negative for visual disturbance. Respiratory: Negative for shortness of breath. Cardiovascular: Negative for chest pain. Gastrointestinal: Negative for abdominal pain, blood in stool, constipation, diarrhea, nausea and vomiting. Genitourinary: Positive for testicular pain.  Negative for discharge, dysuria, flank pain, hematuria, penile pain, penile swelling and scrotal swelling. Skin: Negative for rash. Neurological: Negative for weakness and headaches. All other systems reviewed and are negative. Positives and Pertinent negatives as per HPI. Except as noted above in the ROS, all other systems were reviewed and negative. PAST MEDICAL HISTORY     Past Medical History:   Diagnosis Date    Cancer [facility])     prostate 2005         SURGICAL HISTORY       Past Surgical History:   Procedure Laterality Date    PROSTATECTOMY               CURRENT MEDICATIONS       Previous Medications    ASPIRIN 81 MG CHEWABLE TABLET    Take 1 tablet by mouth daily    ATORVASTATIN (LIPITOR) 40 MG TABLET    Take 1 tablet by mouth nightly    LEUPROLIDE ACETATE (LUPRON SC)    Inject into the skin    NITROGLYCERIN (NITROSTAT) 0.4 MG SL TABLET    up to max of 3 total doses. If no relief after 1 dose, call 911. ALLERGIES     Patient has no known allergies.     FAMILY HISTORY       Family History   Problem Relation Age of Onset    Other Mother           SOCIAL HISTORY       Social History     Socioeconomic History    Marital status:      Spouse name: Not on file    Number of children: Not on file    Years of education: Not on file    Highest education level: Not on file   Occupational History     Comment: repair jet engine parts   Social Needs    Financial resource strain: Not on file    Food insecurity     Worry: Not on file     Inability: Not on file    Transportation needs     Medical: Not on file     Non-medical: Not on file   Tobacco Use    Smoking status: Former Smoker     Last attempt to quit: 2016     Years since quittin.4    Smokeless tobacco: Never Used   Substance and Sexual Activity    Alcohol use: Not Currently    Drug use: Not Currently    Sexual activity: Not on file   Lifestyle    Physical activity     Days per week: Not on file     Minutes per session: Not on file    Stress: Not on file   Relationships    Social Comments: DANYEL Coyle  Musculoskeletal: Normal range of motion. General: No tenderness. Skin:     General: Skin is warm and dry. Coloration: Skin is not pale. Neurological:      Mental Status: He is alert and oriented to person, place, and time. Coordination: Coordination normal.   Psychiatric:         Behavior: Behavior normal.         DIAGNOSTIC RESULTS   LABS:    Labs Reviewed   URINE RT REFLEX TO CULTURE - Abnormal; Notable for the following components:       Result Value    Clarity, UA CLOUDY (*)     Glucose, Ur 100 (*)     Blood, Urine LARGE (*)     Protein, UA 30 (*)     All other components within normal limits    Narrative:     Performed at:  [facility] [address] [facility], [address] [employer]   Phone [phone]   MICROSCOPIC URINALYSIS - Abnormal; Notable for the following components:    RBC, UA 21-50 (*)     Bacteria, UA 2+ (*)     All other components within normal limits    Narrative:     Performed at:  [facility] [address], [place], [facility] [employer]   Phone [phone]   C.TRACHOMATIS N.GONORRHOEAE DNA, URINE   CULTURE, URINE       All other labs werewithin normal range or not returned as of this dictation. EKG: All EKG's are interpreted by the Emergency Department Physician who either signs or Co-signs this chart in the absence of acardiologist.  Please see their note for interpretation of EKG. RADIOLOGY:   Interpretation per the Radiologist below, if available at the time of this note:    [address]   Final Result   Coarsely calcified atrophic left testicle. This is favored to be related to   prior insult. Underlying neoplasm such as a burnt out germ-cell tumor is   also in the differential.           No results found.       PROCEDURES   Unless otherwise noted below, none     Procedures    CRITICAL CARE TIME     n/a    CONSULTS:  None      EMERGENCY DEPARTMENT COURSE and DIFFERENTIAL DIAGNOSIS/MDM:   Vitals:    Vitals:    06/22/20 1452 06/22/20 1535 06/22/20 1652   BP: (!) 200/82 (!) 186/81 (!) 150/107   Pulse: 62 57 63   Resp: 16 14 14   Temp: 98.1 °F (36.7 °C)     TempSrc: Oral     SpO2:  100%    Weight: 222 lb (100.7 kg)     Height: 6' 3\" (1.905 m)         Josiah Molina was given the following medications:  Medications   oxyCODONE-acetaminophen (PERCOCET) 5-325 MG per tablet 1 tablet (1 tablet Oral Given 6/22/20 1531)       Josiah Molina was evaluated in the emergency department with concern for testicular pain. Treated with Percocet in the ER. The pain is bilateral.  On exam cremasteric reflexes present. Left testicle smaller than right. No tenderness on palpation. Urinalysis shows hematuria. This will be sent for culture. Empiric treatment with antibiotics is warranted. Ultrasound imaging shows coarsely calcified atrophic left testicle. Possibly from prior insult. Neoplasm is within the differential.  Follow-up with urology is recommended. My suspicion is low for renal calculi, testicular torsion, epididymitis, Audra's gangrene, or appendiceal torsion. Josiah Molina is stable in the ER and safe to follow as an outpatient. The patient is discharged on the following medications. They were counseled on how to take the newly prescribed medications:  New Prescriptions    CIPROFLOXACIN (CIPRO) 500 MG TABLET    Take 1 tablet by mouth 2 times daily for 7 days    . Instructed to follow-up with:  MD Moncho Cho [facility]  [facility] [address] [place] [address]  [phone]    Schedule an appointment as soon as possible for a visit in 3 days  if you need a referral to Urology    Return to the ER for new or worsening symptoms. I evaluated the patient. The physician was available for consultation as needed.   The patient and / or the family were informed of the results of any tests, a time was given to answer questions, a plan was proposed and they agreed with plan. FINAL IMPRESSION      1. Other microscopic hematuria    2. Testicular pain    3.  Abnormal finding on diagnostic imaging of left testicle          DISPOSITION/PLAN   DISPOSITION Decision To Discharge 06/22/2020 05:40:17 PM        DISCONTINUED MEDICATIONS:  Discontinued Medications    No medications on file                (Please note that portions of this note were completed with a voice recognition program.  Efforts were made to edit the dictations but occasionally words are mis-transcribed.)    POLO Saha CNP (electronically signed)        POLO Saha CNP  06/22/20 [facility]

## 2020-06-22 NOTE — ED NOTES
Pt request pain medication for home, Sara Arechiga CNP has signed out. Advised Dr. Denver Jensen.       [facility]  06/22/20 2170

## 2020-06-22 NOTE — LETTER
[facility] Emergency Department  [address] [id number]  Phone: [phone]               June 22, 2020    Patient: Pat Shukla   YOB: 1950   Date of Visit: 6/22/2020       To Whom It May Concern:    Pat Shukla was seen and treated in our emergency department on 6/22/2020. He may return to work on 06/24/2020.       Sincerely,             Uzma MONTAÑO        Signature:__________________________________

## 2020-06-22 NOTE — ED NOTES
Pt medicated per orders. Patient resting comfortably with no signs of distress. Denies any needs at this time. Bed locked and in lowest position with both side rails raised. Call light within reach. Patient brought warm blanket at patient's request. Family at bedside.      Chintan Salazar RN  06/22/20 8503

## 2020-06-23 LAB — URINE CULTURE, ROUTINE: NORMAL

## 2020-06-24 LAB
C. TRACHOMATIS DNA ,URINE: NEGATIVE
N. GONORRHOEAE DNA, URINE: NEGATIVE

## 2022-04-26 ENCOUNTER — HOSPITAL ENCOUNTER (OUTPATIENT)
Age: 72
Discharge: HOME | End: 2022-04-26
Payer: MEDICARE

## 2022-04-26 LAB
BUN BLDV-MCNC: 16 MG/DL (ref 7–20)
CREAT SERPL-MCNC: 1 MG/DL (ref 0.8–1.3)
GFR AFRICAN AMERICAN: >60
GFR NON-AFRICAN AMERICAN: >60

## 2022-04-26 PROCEDURE — 84520 ASSAY OF UREA NITROGEN: CPT

## 2022-04-26 PROCEDURE — 36415 COLL VENOUS BLD VENIPUNCTURE: CPT

## 2022-04-26 PROCEDURE — 82565 ASSAY OF CREATININE: CPT

## 2022-04-29 ENCOUNTER — HOSPITAL ENCOUNTER (OUTPATIENT)
Dept: NUCLEAR MEDICINE | Age: 72
Discharge: HOME | End: 2022-04-29
Payer: MEDICARE

## 2022-04-29 ENCOUNTER — HOSPITAL ENCOUNTER (OUTPATIENT)
Dept: CT IMAGING | Age: 72
Discharge: HOME | End: 2022-04-29
Payer: MEDICARE

## 2022-04-29 DIAGNOSIS — M94.9 DISORDER OF CARTILAGE, UNSPECIFIED: ICD-10-CM

## 2022-04-29 DIAGNOSIS — M85.88 OTHER SPECIFIED DISORDERS OF BONE DENSITY AND STRUCTURE, OTHER SITE: ICD-10-CM

## 2022-04-29 DIAGNOSIS — C61 MALIGNANT NEOPLASM OF PROSTATE (HCC): ICD-10-CM

## 2022-04-29 DIAGNOSIS — R97.20 ELEVATED PROSTATE SPECIFIC ANTIGEN (PSA): ICD-10-CM

## 2022-04-29 PROCEDURE — 2580000003 HC RX 258: Performed by: UROLOGY

## 2022-04-29 PROCEDURE — 6360000004 HC RX CONTRAST MEDICATION: Performed by: UROLOGY

## 2022-04-29 PROCEDURE — A9503 TC99M MEDRONATE: HCPCS | Performed by: UROLOGY

## 2022-04-29 PROCEDURE — 3430000000 HC RX DIAGNOSTIC RADIOPHARMACEUTICAL: Performed by: UROLOGY

## 2022-04-29 PROCEDURE — 78306 BONE IMAGING WHOLE BODY: CPT

## 2022-04-29 PROCEDURE — 74178 CT ABD&PLV WO CNTR FLWD CNTR: CPT

## 2022-04-29 RX ORDER — SODIUM CHLORIDE 0.9 % (FLUSH) 0.9 %
10 SYRINGE (ML) INJECTION PRN
Status: DISCONTINUED | OUTPATIENT
Start: 2022-04-29 | End: 2022-04-30 | Stop reason: HOSPADM

## 2022-04-29 RX ORDER — TC 99M MEDRONATE 20 MG/10ML
26.08 INJECTION, POWDER, LYOPHILIZED, FOR SOLUTION INTRAVENOUS
Status: COMPLETED | OUTPATIENT
Start: 2022-04-29 | End: 2022-04-29

## 2022-04-29 RX ADMIN — Medication 10 ML: at 07:55

## 2022-04-29 RX ADMIN — TC 99M MEDRONATE 26.08 MILLICURIE: 20 INJECTION, POWDER, LYOPHILIZED, FOR SOLUTION INTRAVENOUS at 07:55

## 2022-04-29 RX ADMIN — IOPAMIDOL 75 ML: 755 INJECTION, SOLUTION INTRAVENOUS at 06:35

## 2024-08-19 ENCOUNTER — HOSPITAL ENCOUNTER (OUTPATIENT)
Dept: CT IMAGING | Age: 74
Discharge: HOME | End: 2024-08-19
Attending: UROLOGY
Payer: MEDICARE

## 2024-08-19 DIAGNOSIS — M94.9 DISORDER OF CARTILAGE, UNSPECIFIED: ICD-10-CM

## 2024-08-19 DIAGNOSIS — C61 MALIGNANT NEOPLASM OF PROSTATE (HCC): ICD-10-CM

## 2024-08-19 DIAGNOSIS — R31.0 GROSS HEMATURIA: ICD-10-CM

## 2024-08-19 DIAGNOSIS — M81.8: ICD-10-CM

## 2024-08-19 DIAGNOSIS — R97.20 ELEVATED PROSTATE SPECIFIC ANTIGEN (PSA): ICD-10-CM

## 2024-08-19 DIAGNOSIS — M85.88 OTHER SPECIFIED DISORDERS OF BONE DENSITY AND STRUCTURE, OTHER SITE: ICD-10-CM

## 2024-08-19 DIAGNOSIS — R35.1 NOCTURIA: ICD-10-CM

## 2024-08-19 DIAGNOSIS — K42.9 UMBILICAL HERNIA WITHOUT OBSTRUCTION OR GANGRENE: ICD-10-CM

## 2024-08-19 DIAGNOSIS — R97.21 INCREASING PROSTATE SPECIFIC ANTIGEN (PSA) LEVEL AFTER TREATMENT FOR MALIGNANT NEOPLASM OF PROSTATE: ICD-10-CM

## 2024-08-19 PROCEDURE — 74178 CT ABD&PLV WO CNTR FLWD CNTR: CPT | Performed by: UROLOGY

## 2024-08-19 PROCEDURE — 6360000004 HC RX CONTRAST MEDICATION: Performed by: UROLOGY

## 2024-08-19 RX ADMIN — IOPAMIDOL 120 ML: 755 INJECTION, SOLUTION INTRAVENOUS at 14:35

## 2025-05-12 ENCOUNTER — APPOINTMENT (OUTPATIENT)
Dept: CT IMAGING | Age: 75
End: 2025-05-12
Payer: MEDICARE

## 2025-05-12 ENCOUNTER — HOSPITAL ENCOUNTER (EMERGENCY)
Age: 75
Discharge: HOME | End: 2025-05-12
Attending: STUDENT IN AN ORGANIZED HEALTH CARE EDUCATION/TRAINING PROGRAM
Payer: MEDICARE

## 2025-05-12 VITALS
RESPIRATION RATE: 26 BRPM | OXYGEN SATURATION: 99 % | DIASTOLIC BLOOD PRESSURE: 57 MMHG | WEIGHT: 190 LBS | HEART RATE: 74 BPM | HEIGHT: 75 IN | TEMPERATURE: 98.6 F | SYSTOLIC BLOOD PRESSURE: 148 MMHG | BODY MASS INDEX: 23.62 KG/M2

## 2025-05-12 DIAGNOSIS — N12 PYELONEPHRITIS: Primary | ICD-10-CM

## 2025-05-12 LAB
ALBUMIN SERPL-MCNC: 4.1 G/DL (ref 3.4–5)
ALBUMIN/GLOB SERPL: 1.3 {RATIO} (ref 1.1–2.2)
ALP SERPL-CCNC: 71 U/L (ref 40–129)
ALT SERPL-CCNC: 10 U/L (ref 10–40)
ANION GAP SERPL CALCULATED.3IONS-SCNC: 9 MMOL/L (ref 3–16)
AST SERPL-CCNC: 17 U/L (ref 15–37)
BACTERIA URNS QL MICRO: ABNORMAL /HPF
BASOPHILS # BLD: 0 K/UL (ref 0–0.2)
BASOPHILS NFR BLD: 0.4 %
BILIRUB SERPL-MCNC: 0.6 MG/DL (ref 0–1)
BILIRUB UR QL STRIP.AUTO: NEGATIVE
BUN SERPL-MCNC: 10 MG/DL (ref 7–20)
CALCIUM SERPL-MCNC: 8.9 MG/DL (ref 8.3–10.6)
CHLORIDE SERPL-SCNC: 98 MMOL/L (ref 99–110)
CLARITY UR: ABNORMAL
CO2 SERPL-SCNC: 28 MMOL/L (ref 21–32)
COLOR UR: ABNORMAL
CREAT SERPL-MCNC: 0.8 MG/DL (ref 0.8–1.3)
DEPRECATED RDW RBC AUTO: 14.9 % (ref 12.4–15.4)
EOSINOPHIL # BLD: 0.1 K/UL (ref 0–0.6)
EOSINOPHIL NFR BLD: 1.3 %
EPI CELLS #/AREA URNS AUTO: 4 /HPF (ref 0–5)
GFR SERPLBLD CREATININE-BSD FMLA CKD-EPI: >90 ML/MIN/{1.73_M2}
GLUCOSE SERPL-MCNC: 112 MG/DL (ref 70–99)
GLUCOSE UR STRIP.AUTO-MCNC: NEGATIVE MG/DL
HCT VFR BLD AUTO: 36.1 % (ref 40.5–52.5)
HGB BLD-MCNC: 12.2 G/DL (ref 13.5–17.5)
HGB UR QL STRIP.AUTO: ABNORMAL
HYALINE CASTS #/AREA URNS AUTO: 4 /LPF (ref 0–8)
KETONES UR STRIP.AUTO-MCNC: ABNORMAL MG/DL
LEUKOCYTE ESTERASE UR QL STRIP.AUTO: ABNORMAL
LIPASE SERPL-CCNC: 15 U/L (ref 13–60)
LYMPHOCYTES # BLD: 0.7 K/UL (ref 1–5.1)
LYMPHOCYTES NFR BLD: 10.5 %
MCH RBC QN AUTO: 26 PG (ref 26–34)
MCHC RBC AUTO-ENTMCNC: 33.7 G/DL (ref 31–36)
MCV RBC AUTO: 77.2 FL (ref 80–100)
MONOCYTES # BLD: 0.6 K/UL (ref 0–1.3)
MONOCYTES NFR BLD: 8.5 %
NEUTROPHILS # BLD: 5.5 K/UL (ref 1.7–7.7)
NEUTROPHILS NFR BLD: 79.3 %
NITRITE UR QL STRIP.AUTO: POSITIVE
PH UR STRIP.AUTO: 5.5 [PH] (ref 5–8)
PLATELET # BLD AUTO: 212 K/UL (ref 135–450)
PMV BLD AUTO: 7.9 FL (ref 5–10.5)
POTASSIUM SERPL-SCNC: 3.8 MMOL/L (ref 3.5–5.1)
PROCALCITONIN SERPL IA-MCNC: 0.27 NG/ML (ref 0–0.15)
PROT SERPL-MCNC: 7.2 G/DL (ref 6.4–8.2)
PROT UR STRIP.AUTO-MCNC: 100 MG/DL
RBC # BLD AUTO: 4.67 M/UL (ref 4.2–5.9)
RBC CLUMPS #/AREA URNS AUTO: 80 /HPF (ref 0–4)
SODIUM SERPL-SCNC: 135 MMOL/L (ref 136–145)
SP GR UR STRIP.AUTO: 1.01 (ref 1–1.03)
UA COMPLETE W REFLEX CULTURE PNL UR: YES
UA DIPSTICK W REFLEX MICRO PNL UR: YES
URN SPEC COLLECT METH UR: ABNORMAL
UROBILINOGEN UR STRIP-ACNC: 1 E.U./DL
WBC # BLD AUTO: 7 K/UL (ref 4–11)
WBC #/AREA URNS AUTO: 1971 /HPF (ref 0–5)

## 2025-05-12 PROCEDURE — 6360000004 HC RX CONTRAST MEDICATION: Performed by: STUDENT IN AN ORGANIZED HEALTH CARE EDUCATION/TRAINING PROGRAM

## 2025-05-12 PROCEDURE — 87086 URINE CULTURE/COLONY COUNT: CPT

## 2025-05-12 PROCEDURE — 74177 CT ABD & PELVIS W/CONTRAST: CPT

## 2025-05-12 PROCEDURE — 96374 THER/PROPH/DIAG INJ IV PUSH: CPT

## 2025-05-12 PROCEDURE — 6360000002 HC RX W HCPCS: Performed by: STUDENT IN AN ORGANIZED HEALTH CARE EDUCATION/TRAINING PROGRAM

## 2025-05-12 PROCEDURE — 84145 PROCALCITONIN (PCT): CPT

## 2025-05-12 PROCEDURE — 99285 EMERGENCY DEPT VISIT HI MDM: CPT

## 2025-05-12 PROCEDURE — 85025 COMPLETE CBC W/AUTO DIFF WBC: CPT

## 2025-05-12 PROCEDURE — 2500000003 HC RX 250 WO HCPCS: Performed by: STUDENT IN AN ORGANIZED HEALTH CARE EDUCATION/TRAINING PROGRAM

## 2025-05-12 PROCEDURE — 81001 URINALYSIS AUTO W/SCOPE: CPT

## 2025-05-12 PROCEDURE — 87186 SC STD MICRODIL/AGAR DIL: CPT

## 2025-05-12 PROCEDURE — 72131 CT LUMBAR SPINE W/O DYE: CPT

## 2025-05-12 PROCEDURE — 87088 URINE BACTERIA CULTURE: CPT

## 2025-05-12 PROCEDURE — 83690 ASSAY OF LIPASE: CPT

## 2025-05-12 PROCEDURE — 80053 COMPREHEN METABOLIC PANEL: CPT

## 2025-05-12 RX ORDER — CEPHALEXIN 500 MG/1
500 CAPSULE ORAL 2 TIMES DAILY
Qty: 28 CAPSULE | Refills: 0 | Status: SHIPPED | OUTPATIENT
Start: 2025-05-12 | End: 2025-05-12

## 2025-05-12 RX ORDER — CEPHALEXIN 500 MG/1
500 CAPSULE ORAL 2 TIMES DAILY
Qty: 28 CAPSULE | Refills: 0 | Status: SHIPPED
Start: 2025-05-12 | End: 2025-05-12

## 2025-05-12 RX ORDER — CEPHALEXIN 500 MG/1
500 CAPSULE ORAL 2 TIMES DAILY
Qty: 28 CAPSULE | Refills: 0 | Status: SHIPPED | OUTPATIENT
Start: 2025-05-12 | End: 2025-05-26

## 2025-05-12 RX ORDER — IOPAMIDOL 755 MG/ML
75 INJECTION, SOLUTION INTRAVASCULAR
Status: COMPLETED | OUTPATIENT
Start: 2025-05-12 | End: 2025-05-12

## 2025-05-12 RX ADMIN — CEFTRIAXONE SODIUM 1000 MG: 1 INJECTION, POWDER, FOR SOLUTION INTRAMUSCULAR; INTRAVENOUS at 10:49

## 2025-05-12 RX ADMIN — IOPAMIDOL 75 ML: 755 INJECTION, SOLUTION INTRAVENOUS at 08:52

## 2025-05-12 ASSESSMENT — PAIN SCALES - GENERAL: PAINLEVEL_OUTOF10: 1

## 2025-05-12 ASSESSMENT — PAIN - FUNCTIONAL ASSESSMENT: PAIN_FUNCTIONAL_ASSESSMENT: 0-10

## 2025-05-12 NOTE — ED TRIAGE NOTES
Detail Level: Detailed Patient oriented to room and ED throughput process.  Safety measures with ED bed locked in lowest position and call light in reach.  Patient educated on all orders, including any medications.  Patient educated on chief complaint/symptoms. Patient encouraged to ask questions regarding care, medications or treatment plan.  Patient aware of how to reach staff with questions/concerns.     Rajat Segovia MD [facility] Nurse Msg Pool44 minutes ago (12:53 PM)       Order placed, needs to be seen for clearance      Quality 130: Documentation Of Current Medications In The Medical Record: Current Medications Documented Quality 431: Preventive Care And Screening: Unhealthy Alcohol Use - Screening: Patient not identified as an unhealthy alcohol user when screened for unhealthy alcohol use using a systematic screening method Quality 110: Preventive Care And Screening: Influenza Immunization: Influenza Immunization Administered during Influenza season Quality 226: Preventive Care And Screening: Tobacco Use: Screening And Cessation Intervention: Patient screened for tobacco use and is an ex/non-smoker

## 2025-05-12 NOTE — ED PROVIDER NOTES
[facility] EMERGENCY DEPARTMENT  EMERGENCY DEPARTMENT ENCOUNTER      Pt Name: Joe Wright  MRN: [id number]  Birthdate 1950  Date of evaluation: 5/12/2025  Provider: Brenna Montoya MD    CHIEF COMPLAINT       Chief Complaint   Patient presents with    Back Pain     Patient states lower back pain for a month and a half. Patient also states \"I was lying on my back and my body drained fluid out for me\".          HISTORY OF PRESENT ILLNESS   (Location/Symptom, Timing/Onset, Context/Setting, Quality, Duration, Modifying Factors, Severity)  Note limiting factors.   Joe Wright is a 75 y.o. male who presents to the emergency department with pain to the low back and a bandlike distribution ongoing for about 1-1/2 months.  He denies any strain or injury.  The pain is present at rest and with movement.  He is concerned about his kidneys.  He states that he drinks a lot of fluids but is only able to get a small amount of urine out each day over this time.  He states that he feels the urge to urinate but has difficulty initiating urine stream.  He denies any fevers or chills.  He denies any numbness in the groin or legs.      Chart reviewed: Patient has a history of prostate cancer status post prostatectomy.  He is currently on oral chemotherapy.  Nursing Notes were reviewed.    REVIEW OF SYSTEMS    (2-9 systems for level 4, 10 or more for level 5)     Review of Systems    Except as noted above the remainder of the review of systems was reviewed and negative.       PAST MEDICAL HISTORY     Past Medical History:   Diagnosis Date    Cancer (HCC)     prostate 2005         SURGICAL HISTORY       Past Surgical History:   Procedure Laterality Date    PROSTATECTOMY      2005         CURRENT MEDICATIONS       Previous Medications    ASPIRIN 81 MG CHEWABLE TABLET    Take 1 tablet by mouth daily    ATORVASTATIN (LIPITOR) 40 MG TABLET    Take 1 tablet by mouth nightly    LEUPROLIDE ACETATE (LUPRON SC)    Inject into

## 2025-05-14 LAB
BACTERIA UR CULT: ABNORMAL
ORGANISM: ABNORMAL